# Patient Record
Sex: MALE | Race: WHITE | NOT HISPANIC OR LATINO | URBAN - METROPOLITAN AREA
[De-identification: names, ages, dates, MRNs, and addresses within clinical notes are randomized per-mention and may not be internally consistent; named-entity substitution may affect disease eponyms.]

---

## 2020-09-15 ENCOUNTER — AMBULATORY - HEALTHEAST (OUTPATIENT)
Dept: SURGERY | Facility: AMBULATORY SURGERY CENTER | Age: 50
End: 2020-09-15

## 2020-09-15 DIAGNOSIS — Z11.59 ENCOUNTER FOR SCREENING FOR OTHER VIRAL DISEASES: ICD-10-CM

## 2020-10-26 ENCOUNTER — AMBULATORY - HEALTHEAST (OUTPATIENT)
Dept: LAB | Facility: CLINIC | Age: 50
End: 2020-10-26

## 2020-10-26 DIAGNOSIS — Z11.59 ENCOUNTER FOR SCREENING FOR OTHER VIRAL DISEASES: ICD-10-CM

## 2020-10-28 ENCOUNTER — ANESTHESIA - HEALTHEAST (OUTPATIENT)
Dept: SURGERY | Facility: AMBULATORY SURGERY CENTER | Age: 50
End: 2020-10-28

## 2020-10-28 ENCOUNTER — COMMUNICATION - HEALTHEAST (OUTPATIENT)
Dept: SCHEDULING | Facility: CLINIC | Age: 50
End: 2020-10-28

## 2020-10-28 RX ORDER — B-COMPLEX WITH VITAMIN C
2000 TABLET ORAL
Status: SHIPPED | COMMUNITY
Start: 2020-04-27

## 2020-10-28 RX ORDER — ALLOPURINOL 100 MG/1
TABLET ORAL
Status: SHIPPED | COMMUNITY
Start: 2020-08-31

## 2020-10-28 RX ORDER — ATORVASTATIN CALCIUM 10 MG/1
10 TABLET, FILM COATED ORAL DAILY
Status: SHIPPED | COMMUNITY
Start: 2020-08-28

## 2020-10-28 ASSESSMENT — MIFFLIN-ST. JEOR
SCORE: 1990.24
SCORE: 1990.24

## 2020-10-29 ENCOUNTER — HOSPITAL ENCOUNTER (OUTPATIENT)
Dept: SURGERY | Facility: AMBULATORY SURGERY CENTER | Age: 50
Discharge: HOME | End: 2020-10-29
Attending: COLON & RECTAL SURGERY | Admitting: COLON & RECTAL SURGERY

## 2020-10-29 ENCOUNTER — SURGERY - HEALTHEAST (OUTPATIENT)
Dept: SURGERY | Facility: AMBULATORY SURGERY CENTER | Age: 50
End: 2020-10-29

## 2020-10-29 DIAGNOSIS — L02.215 PERINEAL ABSCESS: ICD-10-CM

## 2020-10-29 RX ORDER — OXYCODONE HYDROCHLORIDE 5 MG/1
5-10 TABLET ORAL EVERY 4 HOURS PRN
Qty: 12 TABLET | Refills: 0 | Status: SHIPPED | OUTPATIENT
Start: 2020-10-29

## 2020-10-29 ASSESSMENT — MIFFLIN-ST. JEOR
SCORE: 1990.24
SCORE: 1990.24

## 2021-06-01 ENCOUNTER — RECORDS - HEALTHEAST (OUTPATIENT)
Dept: ADMINISTRATIVE | Facility: CLINIC | Age: 51
End: 2021-06-01

## 2021-06-05 VITALS
WEIGHT: 250 LBS | BODY MASS INDEX: 35.79 KG/M2 | HEIGHT: 70 IN | BODY MASS INDEX: 35.79 KG/M2 | WEIGHT: 250 LBS | HEIGHT: 70 IN

## 2021-06-12 NOTE — ANESTHESIA POSTPROCEDURE EVALUATION
Patient: Sebastian Sanchez  Procedure(s):  EXAM UNDER ANESTHESIA WITH DEBRIDEMENT ABSCESS CAVITY  Anesthesia type: MAC    Patient location: PACU  Last vitals:   Vitals Value Taken Time   /81 10/29/20 1002   Temp 36.1  C (97  F) 10/29/20 0914   Pulse 77 10/29/20 1005   Resp 16 10/29/20 0914   SpO2 97 % 10/29/20 1005   Vitals shown include unvalidated device data.  Post vital signs: stable  Level of consciousness: awake and responds to simple questions  Post-anesthesia pain: pain controlled  Post-anesthesia nausea and vomiting: no  Pulmonary: unassisted, return to baseline  Cardiovascular: stable and blood pressure at baseline  Hydration: adequate  Anesthetic events: no    QCDR Measures:  ASA# 11 - Dinorah-op Cardiac Arrest: ASA11B - Patient did NOT experience unanticipated cardiac arrest  ASA# 12 - Dinorah-op Mortality Rate: ASA12B - Patient did NOT die  ASA# 13 - PACU Re-Intubation Rate: ASA13B - Patient did NOT require a new airway mgmt  ASA# 10 - Composite Anes Safety: ASA10A - No serious adverse event    Additional Notes:

## 2021-06-12 NOTE — ANESTHESIA PREPROCEDURE EVALUATION
Anesthesia Evaluation      Patient summary reviewed   No history of anesthetic complications     Airway   Mallampati: II   Pulmonary - normal exam   (+) sleep apnea on CPAP, , a smoker (30 pack year history)                         Cardiovascular - normal exam  Exercise tolerance: > or = 4 METS  (+) , hypercholesterolemia (high triglycerides),     Rhythm: regular  Rate: normal,         Neuro/Psych    (+) depression,   CVA: K=4.5.    Comments: EtOH 3/day    Endo/Other    (+) obesity,      Comments: gout    GI/Hepatic/Renal - negative ROS      Other findings: K=4.5  Hgb=14  GFR>60      Dental - normal exam                        Anesthesia Plan  Planned anesthetic: general LMA  MAC requested by surgeon  GA backup  Will confirm when he arrives as patient BMI=35 and has h/o VALERY. Check if prone or lithotomy  Antiemetics  toradol at the end of the case if okay with the surgeon  ASA 3     Anesthetic plan and risks discussed with: patient and spouse    Post-op plan: routine recovery

## 2021-06-12 NOTE — ANESTHESIA CARE TRANSFER NOTE
Last vitals:   Vitals:    10/29/20 0832   BP: (P) 135/79   Pulse: (P) 95   Resp: (P) 16   Temp: (P) 36.6  C (97.9  F)   SpO2: (P) 97%     Patient's level of consciousness is awake, wide awake and alert. Denies pain.   Spontaneous respirations: yes  Maintains airway independently: yes  Dentition unchanged: yes  Oropharynx: oropharynx clear of all foreign objects    QCDR Measures:  ASA# 20 - Surgical Safety Checklist: WHO surgical safety checklist completed prior to induction    PQRS# 430 - Adult PONV Prevention: 4558F - Pt received => 2 anti-emetic agents (different classes) preop & intraop  ASA# 8 - Peds PONV Prevention: NA - Not pediatric patient, not GA or 2 or more risk factors NOT present  PQRS# 424 - Dinorah-op Temp Management: 4559F - At least one body temp DOCUMENTED => 35.5C or 95.9F within required timeframe  PQRS# 426 - PACU Transfer Protocol: - Transfer of care checklist used  ASA# 14 - Acute Post-op Pain: ASA14B - Patient did NOT experience pain >= 7 out of 10

## 2021-07-03 NOTE — INTERVAL H&P NOTE
Interval H&P Note by Kristi Barrios MD at 10/29/2020  7:30 AM     Author: Kristi Barrios MD Service: Surgery Author Type: Physician    Filed: 10/29/2020  7:42 AM Date of Service: 10/29/2020  7:30 AM Status: Signed    : Kristi Barrios MD (Physician)       I have performed an assessment and examined the patient, as necessary, to update the patient's current status that may have changed since the prior History and Physical.  The History & Physical has been reviewed and no updates are needed.     COVID negative.    Plan for OR possible EUA, possible fistulotomy, possible seton.    Kristi Barrios MD, CHUCKIE  [facility]  Pager: [phone]  Office: [phone]  10/29/2020 7:42 AM          Source Note     Author: Epic, User Service: -- Author Type: Registered Nurse    Filed: 10/12/2020  1:47 PM Date of Service: 10/29/2020  7:30 AM Status: Signed    : Barbra Barreto

## 2021-07-04 NOTE — OP NOTE
Op Note by Kristi Barrios MD at 10/29/2020  6:43 AM     Author: Kristi Barrios MD Service: Surgery Author Type: Physician    Filed: 10/29/2020  8:50 AM Date of Service: 10/29/2020  6:43 AM Status: Signed    : Kristi Barrios MD (Physician)       [facility]    DATE OF SERVICE: 10/29/2020     PROCEDURE NAME:   1. Rectal exam under anesthesia  2. Debridement of perineal abscess cavity (with marsupialization)     PRE-PROCEDURE DIAGNOSIS: Recurrent perineal absces     POST-PROCEDURE DIAGNOSIS: Same     SURGEON: Kristi Barrios MD     ASSISTANT: None     FINDINGS: There was what appeared to be an external opening on the left side of the base of the scrotum and another area that was a possible external opening just lateral to anterior to the external sphincter on the left side. 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 It was unroofed, sharply debrided with a curette, and marsupialized.     ESTIMATED BLOOD LOSS: 10 mL     ANESTHESIA: GETA     SPECIMEN: None.     INDICATIONS FOR PROCEDURE:  Sebastian Sanchez is a 50 y.o. 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 The risks and benefits of surgery were discussed with the patient including infection, abscess recurrence, need for further operative interventions, possible damage to the sphincter and incontinence (or changes in continence) and increased pain and bleeding were discussed with the patient. Alternatives were also discussed. The patient agreed to proceed with surgery and informed consent was obtained.     DESCRIPTION OF PROCEDURE:  The patient was taken to the operating room and placed under general anesthesia. He was then placed in the lithtomy position on the operating room table. The surgical area was then prepped and draped in the usual sterile fashion. A timeout was performed per protocol.  Examination of the perianal skin was significant for two areas  suspicious for external openings. One was on the base of the scrotum on the left side and the other was just anterior to the external sphincter on the left side.  Next, a terminal pudendal nerve block was performed with 30mL of a 50/50 mix of 1% lidocaine with epinephrine and 0.25% bupivicaine. A digital rectal exam was performed which revealed large internal hemorrhoids, but no internal opening.  Next a lubricated Gray bivalve anoscope was placed into the anal canal.  Examination of the anal canal was significant for large internal hemorrhoids, but not abscess or findings suspicious for fistula.    Based on these findings I proceeded to further evaluate the external openings. A fistula probe did not go into the opening adjacent to the external sphincter, but easily fell into the opening at the base of the scrotum. The probe fell easily in the direction of the rectum, but stopped abruptly at the external sphincter. I could not find any connection to the anal canal. Hydrogen peroxide was injected into this opening, but did not extravasate in to the anal canal. This appeared to be chronic sinus and was opened up over a fistula probe for a length of approximately 5cm. There was a sinus tract filled with granulation tissue. This was curetted out sharply. Hemostasis was obtained with electrocautery. The wound edges were marsupialized down with a 3-0 chromic suture.     Hemostasis was achieved. I then removed the anoscope and placed a surgicel in the wound bed. The surrounding skin was covered with bacitracin and gauze fluffs, an ABD, and mesh underwear. All sponge, needle and instrument counts were correct at the end of the procedure. The patient tolerated the procedure well and was awakened from anesthesia without difficulty, and transferred to the recovery room in stable condition.    COMPLICATIONS: None apparent    DISPOSITION: Stable, extubated, to PACU    Kristi Barrios MD, CHUCKIE  [facility]  Pager: [phone]  Office: [phone]  10/29/2020 8:36 AM